# Patient Record
Sex: MALE | Race: OTHER | Employment: FULL TIME | URBAN - METROPOLITAN AREA
[De-identification: names, ages, dates, MRNs, and addresses within clinical notes are randomized per-mention and may not be internally consistent; named-entity substitution may affect disease eponyms.]

---

## 2017-03-20 ENCOUNTER — HOSPITAL ENCOUNTER (OUTPATIENT)
Dept: CT IMAGING | Age: 77
Discharge: HOME | End: 2017-03-20
Attending: FAMILY MEDICINE
Payer: MEDICARE

## 2017-03-20 DIAGNOSIS — N28.1 RENAL CYST: ICD-10-CM

## 2017-03-20 LAB — CREAT UR-MCNC: 1.4 MG/DL (ref 0.6–1.3)

## 2017-03-20 PROCEDURE — 74011636320 HC RX REV CODE- 636/320: Performed by: FAMILY MEDICINE

## 2017-03-20 PROCEDURE — 74178 CT ABD&PLV WO CNTR FLWD CNTR: CPT

## 2017-03-20 PROCEDURE — 82565 ASSAY OF CREATININE: CPT

## 2017-03-20 RX ADMIN — IOPAMIDOL 100 ML: 612 INJECTION, SOLUTION INTRAVENOUS at 13:00

## 2017-06-12 ENCOUNTER — HOSPITAL ENCOUNTER (OUTPATIENT)
Dept: GENERAL RADIOLOGY | Age: 77
Discharge: HOME | End: 2017-06-12
Payer: MEDICARE

## 2017-06-12 DIAGNOSIS — J44.9 COPD (CHRONIC OBSTRUCTIVE PULMONARY DISEASE) (HCC): ICD-10-CM

## 2017-06-12 PROCEDURE — 71020 XR CHEST PA LAT: CPT

## 2017-06-23 ENCOUNTER — HOSPITAL ENCOUNTER (OUTPATIENT)
Dept: LAB | Age: 77
Discharge: HOME | End: 2017-06-23
Payer: MEDICARE

## 2017-06-23 LAB
25(OH)D3 SERPL-MCNC: 26.7 NG/ML (ref 30–100)
ALBUMIN SERPL BCP-MCNC: 4 G/DL (ref 3.4–5)
ANION GAP BLD CALC-SCNC: 8 MMOL/L (ref 3–18)
BASOPHILS # BLD AUTO: 0 K/UL (ref 0–0.1)
BASOPHILS # BLD: 0 % (ref 0–2)
BUN SERPL-MCNC: 20 MG/DL (ref 7–18)
BUN/CREAT SERPL: 13 (ref 12–20)
CALCIUM SERPL-MCNC: 9 MG/DL (ref 8.5–10.1)
CALCIUM SERPL-MCNC: 9.5 MG/DL (ref 8.5–10.1)
CHLORIDE SERPL-SCNC: 108 MMOL/L (ref 100–108)
CO2 SERPL-SCNC: 27 MMOL/L (ref 21–32)
CREAT SERPL-MCNC: 1.52 MG/DL (ref 0.6–1.3)
CREAT UR-MCNC: 334 MG/DL (ref 30–125)
DIFFERENTIAL METHOD BLD: ABNORMAL
EOSINOPHIL # BLD: 0.3 K/UL (ref 0–0.4)
EOSINOPHIL NFR BLD: 6 % (ref 0–5)
ERYTHROCYTE [DISTWIDTH] IN BLOOD BY AUTOMATED COUNT: 13.4 % (ref 11.6–14.5)
GLUCOSE SERPL-MCNC: 106 MG/DL (ref 74–99)
HCT VFR BLD AUTO: 40.1 % (ref 36–48)
HGB BLD-MCNC: 14.3 G/DL (ref 13–16)
LYMPHOCYTES # BLD AUTO: 36 % (ref 21–52)
LYMPHOCYTES # BLD: 1.7 K/UL (ref 0.9–3.6)
MCH RBC QN AUTO: 28.4 PG (ref 24–34)
MCHC RBC AUTO-ENTMCNC: 35.7 G/DL (ref 31–37)
MCV RBC AUTO: 79.7 FL (ref 74–97)
MONOCYTES # BLD: 0.5 K/UL (ref 0.05–1.2)
MONOCYTES NFR BLD AUTO: 10 % (ref 3–10)
NEUTS SEG # BLD: 2.3 K/UL (ref 1.8–8)
NEUTS SEG NFR BLD AUTO: 48 % (ref 40–73)
PHOSPHATE SERPL-MCNC: 3.5 MG/DL (ref 2.5–4.9)
PLATELET # BLD AUTO: 279 K/UL (ref 135–420)
PMV BLD AUTO: 10.3 FL (ref 9.2–11.8)
POTASSIUM SERPL-SCNC: 3.7 MMOL/L (ref 3.5–5.5)
PROT UR-MCNC: 28 MG/DL
PROT/CREAT UR-RTO: 0.1
PTH-INTACT SERPL-MCNC: 44.6 PG/ML (ref 14–72)
RBC # BLD AUTO: 5.03 M/UL (ref 4.7–5.5)
SODIUM SERPL-SCNC: 143 MMOL/L (ref 136–145)
WBC # BLD AUTO: 4.8 K/UL (ref 4.6–13.2)

## 2017-06-23 PROCEDURE — 83970 ASSAY OF PARATHORMONE: CPT | Performed by: INTERNAL MEDICINE

## 2017-06-23 PROCEDURE — 80069 RENAL FUNCTION PANEL: CPT | Performed by: INTERNAL MEDICINE

## 2017-06-23 PROCEDURE — 84156 ASSAY OF PROTEIN URINE: CPT | Performed by: INTERNAL MEDICINE

## 2017-06-23 PROCEDURE — 82306 VITAMIN D 25 HYDROXY: CPT | Performed by: INTERNAL MEDICINE

## 2017-06-23 PROCEDURE — 85025 COMPLETE CBC W/AUTO DIFF WBC: CPT | Performed by: INTERNAL MEDICINE

## 2017-06-23 PROCEDURE — 36415 COLL VENOUS BLD VENIPUNCTURE: CPT | Performed by: INTERNAL MEDICINE

## 2017-07-26 ENCOUNTER — OFFICE VISIT (OUTPATIENT)
Dept: CARDIOLOGY CLINIC | Age: 77
End: 2017-07-26

## 2017-07-26 VITALS
DIASTOLIC BLOOD PRESSURE: 82 MMHG | HEART RATE: 52 BPM | BODY MASS INDEX: 26.2 KG/M2 | SYSTOLIC BLOOD PRESSURE: 133 MMHG | HEIGHT: 66 IN | WEIGHT: 163 LBS

## 2017-07-26 DIAGNOSIS — J44.9 CHRONIC OBSTRUCTIVE PULMONARY DISEASE, UNSPECIFIED COPD TYPE (HCC): ICD-10-CM

## 2017-07-26 DIAGNOSIS — C14.0 THROAT CANCER (HCC): ICD-10-CM

## 2017-07-26 DIAGNOSIS — Z01.810 PRE-OPERATIVE CARDIOVASCULAR EXAMINATION: Primary | ICD-10-CM

## 2017-07-26 DIAGNOSIS — I49.1 PAC (PREMATURE ATRIAL CONTRACTION): ICD-10-CM

## 2017-07-26 RX ORDER — ASPIRIN 81 MG/1
TABLET ORAL DAILY
COMMUNITY
Start: 2021-06-10 | End: 2021-06-10

## 2017-07-26 RX ORDER — AMLODIPINE BESYLATE 10 MG/1
TABLET ORAL DAILY
COMMUNITY
Start: 2021-06-10 | End: 2021-06-10

## 2017-07-26 RX ORDER — ATENOLOL 50 MG/1
50 TABLET ORAL DAILY
COMMUNITY

## 2017-07-26 RX ORDER — SIMVASTATIN 20 MG/1
20 TABLET, FILM COATED ORAL
COMMUNITY

## 2017-07-26 RX ORDER — PANTOPRAZOLE SODIUM 40 MG/1
40 GRANULE, DELAYED RELEASE ORAL DAILY
Status: ON HOLD | COMMUNITY
End: 2017-08-01 | Stop reason: CLARIF

## 2017-07-26 NOTE — PROGRESS NOTES
HISTORY OF PRESENT ILLNESS  Mila Clark is a 69 y.o. male. HPI Comments: Patient with throat cancer-scheduled for biopsy  Here for pre op clearance  Has h/o copd and pac on ekg  On follow up patient denies any chest pains,sob, palpitation or other significant symptoms. New Patient   The history is provided by the patient. Pertinent negatives include no chest pain, no abdominal pain, no headaches and no shortness of breath. Review of Systems   Constitutional: Negative for chills and fever. HENT: Negative for nosebleeds. Eyes: Negative for blurred vision and double vision. Respiratory: Negative for cough, hemoptysis, sputum production, shortness of breath and wheezing. Cardiovascular: Negative for chest pain, palpitations, orthopnea, claudication, leg swelling and PND. Gastrointestinal: Negative for abdominal pain, heartburn, nausea and vomiting. Musculoskeletal: Negative for myalgias. Skin: Negative for rash. Neurological: Negative for dizziness, weakness and headaches. Endo/Heme/Allergies: Does not bruise/bleed easily. Family History   Problem Relation Age of Onset    Heart Disease Brother      murmur       Past Medical History:   Diagnosis Date    Abdominal cramps 1/2013    Chronic obstructive pulmonary disease ([facility]     GERD (gastroesophageal reflux disease)     Laryngeal cancer (HCC)     as per MD Pre-op diagnosis       Past Surgical History:   Procedure Laterality Date    HX CHOLECYSTECTOMY      HX HEENT  2016       Social History   Substance Use Topics    Smoking status: Former Smoker     Packs/day: 0.50     Years: 60.00     Quit date: 7/26/2016    Smokeless tobacco: Never Used    Alcohol use Yes      Comment: occasional       Allergies   Allergen Reactions    Pcn [Penicillins] Rash     Other reaction(s): unknown       Prior to Admission medications    Medication Sig Start Date End Date Taking?  Authorizing Provider   simvastatin (ZOCOR) 20 mg tablet Take  by mouth nightly. Yes Historical Provider   amLODIPine (NORVASC) 10 mg tablet Take  by mouth daily. Yes Historical Provider   atenolol (TENORMIN) 50 mg tablet Take  by mouth daily. Yes Historical Provider   aspirin delayed-release 81 mg tablet Take  by mouth daily. Yes Historical Provider   Levocetirizine (XYZAL) 5 mg tablet Take 5 mg by mouth daily. Yes Historical Provider   pantoprazole (PROTONIX) 40 mg granules for oral suspension 40 mg daily. Historical Provider   gabapentin (NEURONTIN) 100 mg capsule Take  by mouth three (3) times daily. Historical Provider   meloxicam (MOBIC) 7.5 mg tablet Take  by mouth daily. Historical Provider   methocarbamol (ROBAXIN) 500 mg tablet Take  by mouth four (4) times daily. Historical Provider   ranitidine (ZANTAC) 150 mg tablet Take 150 mg by mouth two (2) times a day. Historical Provider         Visit Vitals    /82    Pulse (!) 52    Ht 5' 6\" (1.676 m)    Wt 73.9 kg (163 lb)    BMI 26.31 kg/m2       Physical Exam   Constitutional: He is oriented to person, place, and time. He appears well-developed and well-nourished. HENT:   Head: Normocephalic and atraumatic. Eyes: Conjunctivae are normal.   Neck: Neck supple. No JVD present. No tracheal deviation present. No thyromegaly present. Cardiovascular: Normal rate, regular rhythm and normal heart sounds. Exam reveals no gallop and no friction rub. No murmur heard. Pulmonary/Chest: Breath sounds normal. No respiratory distress. He has no wheezes. He has no rales. He exhibits no tenderness. Abdominal: Soft. There is no tenderness. Musculoskeletal: He exhibits no edema. Neurological: He is alert and oriented to person, place, and time. Skin: Skin is warm and dry. Psychiatric: He has a normal mood and affect. Mr. Arabella Campos has a reminder for a \"due or due soon\" health maintenance.  I have asked that he contact his primary care provider for follow-up on this health maintenance. I have personally reviewed patients ekg done at other facility. Sr,pac-7/2017    Assessment         ICD-10-CM ICD-9-CM    1. Pre-operative cardiovascular examination Z01.810 V72.81     stable cardiac status  ok to proceed -low risk       2. PAC (premature atrial contraction) I49.1 427.61     on ekg  asymptomatic   3. Throat cancer ([facility] C14.0 149.0     pre op for biopsy   4. Chronic obstructive pulmonary disease, unspecified COPD type ([facility] J44.9 496     on treatment stable       There are no discontinued medications. No orders of the defined types were placed in this encounter. Follow-up Disposition:  Return if symptoms worsen or fail to improve, for Cleared for planned surgery, low risk.

## 2017-07-26 NOTE — LETTER
2017 [address] [facility] [place] [address] Dear Gerald Longoria: 
 
Re: Rand Mariee : 1940 Mr. Devan Schmidt is cleared from a cardiac standpoint with mild risk for throat surgery scheduled on 17. If you have any questions or any further assistance is needed please contact our office. Sincerely, Orly Markham MD 
  
cc:  Basim Greer MD

## 2017-07-26 NOTE — LETTER
[facility] 1940 
 
2017 Dear Sybil Fish MD 
 
I had the pleasure of evaluating  Mr. Franco in office today. Below are the relevant portions of my assessment and plan of care. ICD-10-CM ICD-9-CM 1. Pre-operative cardiovascular examination Z01.810 V72.81   
 stable cardiac status 
ok to proceed -low risk 2. PAC (premature atrial contraction) I49.1 427.61   
 on ekg 
asymptomatic 3. Throat cancer ([facility] C14.0 149.0   
 pre op for biopsy 4. Chronic obstructive pulmonary disease, unspecified COPD type ([facility] J44.9 496   
 on treatment stable Current Outpatient Prescriptions Medication Sig Dispense Refill  simvastatin (ZOCOR) 20 mg tablet Take  by mouth nightly.  amLODIPine (NORVASC) 10 mg tablet Take  by mouth daily.  atenolol (TENORMIN) 50 mg tablet Take  by mouth daily.  aspirin delayed-release 81 mg tablet Take  by mouth daily.  Levocetirizine (XYZAL) 5 mg tablet Take 5 mg by mouth daily.  pantoprazole (PROTONIX) 40 mg granules for oral suspension 40 mg daily.  gabapentin (NEURONTIN) 100 mg capsule Take  by mouth three (3) times daily.  meloxicam (MOBIC) 7.5 mg tablet Take  by mouth daily.  methocarbamol (ROBAXIN) 500 mg tablet Take  by mouth four (4) times daily.  ranitidine (ZANTAC) 150 mg tablet Take 150 mg by mouth two (2) times a day. Orders Placed This Encounter  simvastatin (ZOCOR) 20 mg tablet Sig: Take  by mouth nightly.  pantoprazole (PROTONIX) 40 mg granules for oral suspension Si mg daily.  amLODIPine (NORVASC) 10 mg tablet Sig: Take  by mouth daily.  atenolol (TENORMIN) 50 mg tablet Sig: Take  by mouth daily.  aspirin delayed-release 81 mg tablet Sig: Take  by mouth daily. If you have questions, please do not hesitate to call me. I look forward to following Mr. Franco along with you. Sincerely, Drew Park MD

## 2017-08-01 PROBLEM — J38.3 LESION OF VOCAL CORD: Status: ACTIVE | Noted: 2017-08-01

## 2017-10-25 ENCOUNTER — HOSPITAL ENCOUNTER (OUTPATIENT)
Dept: CT IMAGING | Age: 77
Discharge: HOME | End: 2017-10-25
Attending: FAMILY MEDICINE
Payer: MEDICARE

## 2017-10-25 DIAGNOSIS — Q61.02 MULTIPLE RENAL CYSTS: ICD-10-CM

## 2017-10-25 LAB — CREAT UR-MCNC: 1.3 MG/DL (ref 0.6–1.3)

## 2017-10-25 PROCEDURE — 74011636320 HC RX REV CODE- 636/320

## 2017-10-25 PROCEDURE — 74178 CT ABD&PLV WO CNTR FLWD CNTR: CPT

## 2017-10-25 PROCEDURE — 82565 ASSAY OF CREATININE: CPT

## 2017-10-25 RX ADMIN — IOPAMIDOL 100 ML: 612 INJECTION, SOLUTION INTRAVENOUS at 07:34

## 2018-07-17 ENCOUNTER — OFFICE VISIT (OUTPATIENT)
Dept: UROLOGY | Age: 78
End: 2018-07-17

## 2018-07-17 VITALS
HEIGHT: 66 IN | OXYGEN SATURATION: 99 % | WEIGHT: 167 LBS | DIASTOLIC BLOOD PRESSURE: 72 MMHG | HEART RATE: 54 BPM | BODY MASS INDEX: 26.84 KG/M2 | SYSTOLIC BLOOD PRESSURE: 142 MMHG

## 2018-07-17 DIAGNOSIS — R97.20 RISING PSA LEVEL: Primary | ICD-10-CM

## 2018-07-17 LAB
BILIRUB UR QL STRIP: NEGATIVE
GLUCOSE UR-MCNC: NEGATIVE MG/DL
KETONES P FAST UR STRIP-MCNC: NEGATIVE MG/DL
PH UR STRIP: 5.5 [PH] (ref 4.6–8)
PROT UR QL STRIP: NEGATIVE
SP GR UR STRIP: 1.02 (ref 1–1.03)
UA UROBILINOGEN AMB POC: NORMAL (ref 0.2–1)
URINALYSIS CLARITY POC: CLEAR
URINALYSIS COLOR POC: YELLOW
URINE BLOOD POC: NEGATIVE
URINE LEUKOCYTES POC: NEGATIVE
URINE NITRITES POC: NEGATIVE

## 2018-07-17 NOTE — PROGRESS NOTES
MrAnali Denise Haque has a reminder for a \"due or due soon\" health maintenance. I have asked that he contact his primary care provider for follow-up on this health maintenance.

## 2018-07-17 NOTE — MR AVS SNAPSHOT
[address] [facility] [address] [address] 
[phone] Patient: Oj Armijo MRN: [id number]:[id number] Visit Information Date & Time Provider Department Dept. Phone Encounter #  
 7/17/2018  1:45 PM Waqar Hodges [place] [facility] [phone] [id number] Follow-up Instructions Return in about 1 year (around 7/17/2019) for PSA MIGDALIA PVR. Follow-up and Disposition History Your Appointments 7/17/2019  9:00 AM  
Office Visit with Hemant Hall MD  
[facility] [address] [facility]) Appt Note: PSA  
 [address] [facility] [address] [address]  
[phone] [address] [facility] [address] Upcoming Health Maintenance Date Due DTaP/Tdap/Td series (1 - Tdap) 3/31/1962 ZOSTER VACCINE AGE 60> 1/31/2001 GLAUCOMA SCREENING Q2Y 3/31/2006 Pneumococcal 65+ High/Highest Risk (1 of 2 - PCV13) 3/31/2006 MEDICARE YEARLY EXAM 3/14/2018 Influenza Age 5 to Adult 8/1/2018 Allergies as of 7/17/2018  Review Complete On: 7/17/2018 By: Hemant Hall MD  
  
 Severity Noted Reaction Type Reactions Pcn [Penicillins]  08/15/2012    Rash Other reaction(s): unknown Current Immunizations  Never Reviewed No immunizations on file. Not reviewed this visit You Were Diagnosed With   
  
 Codes Comments Rising PSA level    -  Primary ICD-10-CM: R97.20 ICD-9-CM: 790.93 Vitals BP Pulse Height(growth percentile) Weight(growth percentile) SpO2 BMI  
 142/72 (BP 1 Location: Left arm, BP Patient Position: Sitting) (!) 54 5' 6\" (1.676 m) 167 lb (75.8 kg) 99% 26.95 kg/m2 Smoking Status Former Smoker Vitals History BMI and BSA Data Body Mass Index Body Surface Area  
 26.95 kg/m 2 1.88 m 2 Preferred Pharmacy Pharmacy Name Phone [id number]- Collin Bowden, [address] [place] [address] Your Updated Medication List  
  
   
This list is accurate as of 7/17/18  4:35 PM.  Always use your most recent med list. amLODIPine 10 mg tablet Commonly known as:  Jailene Johansen Take  by mouth daily. aspirin delayed-release 81 mg tablet Take  by mouth daily. atenolol 50 mg tablet Commonly known as:  TENORMIN Take  by mouth daily. levocetirizine 5 mg tablet Commonly known as:  Oletha Certain Take 5 mg by mouth daily. simvastatin 20 mg tablet Commonly known as:  ZOCOR Take  by mouth nightly. We Performed the Following AMB POC URINALYSIS DIP STICK AUTO W/O MICRO [72232 CPT(R)] Follow-up Instructions Return in about 1 year (around 7/17/2019) for PSA MIGDALIA PVR. Patient Instructions Prostate Cancer Screening: Care Instructions Your Care Instructions The prostate gland is an organ found just below a man's bladder. It is the size and shape of a walnut. It surrounds the tube that carries urine from the bladder out of the body through the penis. This tube is called the urethra. Prostate cancer is the abnormal growth of cells in the prostate. It is the second most common type of cancer in men. (Skin cancer is the most common.) Most cases of prostate cancer occur in men older than 72. The disease runs in families. And it's more common in -American men. When it's found and treated early, prostate cancer may be cured. But it is not always treated. This is because prostate cancer may not shorten your life, especially if you are older and the cancer is growing slowly. Follow-up care is a key part of your treatment and safety. Be sure to make and go to all appointments, and call your doctor if you are having problems. It's also a good idea to know your test results and keep a list of the medicines you take. What are the screening tests for prostate cancer? The main screening test for prostate cancer is the prostate-specific antigen (PSA) test. This is a blood test that measures how much PSA is in your blood. A high level may mean that you have an enlargement, an infection, or cancer. Along with the PSA test, you may have a digital rectal exam. The digital (finger) rectal exam checks for anything abnormal in your prostate. To do the exam, the doctor puts a lubricated, gloved finger into your rectum. If these tests suggest cancer, you may need a prostate biopsy. How is prostate cancer diagnosed? In a biopsy, the doctor takes small tissue samples from your prostate gland. Another doctor then looks at the tissue under a microscope to see if there are cancer cells, signs of infection, or other problems. The results help diagnose prostate cancer. What are the pros and cons of screening? Neither a PSA test nor a digital rectal exam can tell you for sure that you do or do not have cancer. But they can help you decide if you need more tests, such as a prostate biopsy. Screening tests may be useful because most men with prostate cancer don't have symptoms. It can be hard to know if you have cancer until it is more advanced. And then it's harder to treat. But having a PSA test can also cause harm. The test may show high levels of PSA that aren't caused by cancer. So you could have a prostate biopsy you didn't need. Or the PSA test might be normal when there is cancer, so a cancer might not be found early. The test can also find cancers that would never have caused a problem during your lifetime. So you might have treatment that was not needed. Prostate cancer usually develops late in life and grows slowly. For many men, it does not shorten their lives. Some experts advise screening only for men who are at high risk. Talk with your doctor to see if screening is right for you. Where can you learn more? Go to http://errol-tigre.info/. Enter R550 in the search box to learn more about \"Prostate Cancer Screening: Care Instructions. \" Current as of: May 12, 2017 Content Version: 11.7 © 6583-9692 Presidio. Care instructions adapted under license by Ease My Sell (which disclaims liability or warranty for this information). If you have questions about a medical condition or this instruction, always ask your healthcare professional. [address] any warranty or liability for your use of this information. Prostate-Specific Antigen (PSA) Test: About This Test 
What is it? A prostate-specific antigen (PSA) test measures the amount of PSA in your blood. PSA is released by a man's prostate gland into his blood. A high PSA level may mean that you have an enlargement, infection, or cancer of the prostate. Why is this test done? You may have this test to: · Check for prostate cancer. · Watch prostate cancer and see if treatment is working. How can you prepare for the test? 
Do not ejaculate during the 2 days before your PSA blood test, either during sex or masturbation. What happens before the test? 
Tell your doctor if you have had a: 
· Test to look at your bladder (cystoscopy) in the past several weeks. · Prostate biopsy in the past several weeks. · Prostate infection or urinary tract infection that has not gone away. · Tube (catheter) inserted into your bladder to drain urine recently. What happens during the test? 
A health professional takes a sample of your blood. What happens after the test? 
You can go back to your usual activities right away. When should you call for help? Watch closely for changes in your health, and be sure to contact your doctor if you have any questions about this test. 
Follow-up care is a key part of your treatment and safety.  Be sure to make and go to all appointments, and call your doctor if you are having problems. It's also a good idea to keep a list of the medicines you take. Ask your doctor when you can expect to have your test results. Where can you learn more? Go to http://errol-tigre.info/. Enter R591 in the search box to learn more about \"Prostate-Specific Antigen (PSA) Test: About This Test.\" Current as of: May 12, 2017 Content Version: 11.7 © 9676-0412 Worklight. Care instructions adapted under license by SurDoc (which disclaims liability or warranty for this information). If you have questions about a medical condition or this instruction, always ask your healthcare professional. [address] any warranty or liability for your use of this information. Patient Instructions History Introducing [facility]! Renuka Puentes introduces Stemnion patient portal. Now you can access parts of your medical record, email your doctor's office, and request medication refills online. 1. In your internet browser, go to [email or web address] 2. Click on the First Time User? Click Here link in the Sign In box. You will see the New Member Sign Up page. 3. Enter your Stemnion Access Code exactly as it appears below. You will not need to use this code after youve completed the sign-up process. If you do not sign up before the expiration date, you must request a new code. · Stemnion Access Code: [email or web address] Expires: 10/15/2018  4:35 PM 
 
4. Enter the last four digits of your Social Security Number (xxxx) and Date of Birth (mm/dd/yyyy) as indicated and click Submit. You will be taken to the next sign-up page. 5. Create a Stemnion ID. This will be your Stemnion login ID and cannot be changed, so think of one that is secure and easy to remember. 6. Create a ComVibet password. You can change your password at any time. 7. Enter your Password Reset Question and Answer. This can be used at a later time if you forget your password. 8. Enter your e-mail address. You will receive e-mail notification when new information is available in [address] 9. Click Sign Up. You can now view and download portions of your medical record. 10. Click the Download Summary menu link to download a portable copy of your medical information. If you have questions, please visit the Frequently Asked Questions section of the Eayun website. Remember, Eayun is NOT to be used for urgent needs. For medical emergencies, dial 911. Now available from your iPhone and Android! Please provide this summary of care documentation to your next provider. Your primary care clinician is listed as Chris Cortes. If you have any questions after today's visit, please call [phone].

## 2018-07-17 NOTE — PATIENT INSTRUCTIONS
Prostate Cancer Screening: Care Instructions Your Care Instructions The prostate gland is an organ found just below a man's bladder. It is the size and shape of a walnut. It surrounds the tube that carries urine from the bladder out of the body through the penis. This tube is called the urethra. Prostate cancer is the abnormal growth of cells in the prostate. It is the second most common type of cancer in men. (Skin cancer is the most common.) Most cases of prostate cancer occur in men older than 72. The disease runs in families. And it's more common in -American men. When it's found and treated early, prostate cancer may be cured. But it is not always treated. This is because prostate cancer may not shorten your life, especially if you are older and the cancer is growing slowly. Follow-up care is a key part of your treatment and safety. Be sure to make and go to all appointments, and call your doctor if you are having problems. It's also a good idea to know your test results and keep a list of the medicines you take. What are the screening tests for prostate cancer? The main screening test for prostate cancer is the prostate-specific antigen (PSA) test. This is a blood test that measures how much PSA is in your blood. A high level may mean that you have an enlargement, an infection, or cancer. Along with the PSA test, you may have a digital rectal exam. The digital (finger) rectal exam checks for anything abnormal in your prostate. To do the exam, the doctor puts a lubricated, gloved finger into your rectum. If these tests suggest cancer, you may need a prostate biopsy. How is prostate cancer diagnosed? In a biopsy, the doctor takes small tissue samples from your prostate gland. Another doctor then looks at the tissue under a microscope to see if there are cancer cells, signs of infection, or other problems. The results help diagnose prostate cancer.  
What are the pros and cons of screening? Neither a PSA test nor a digital rectal exam can tell you for sure that you do or do not have cancer. But they can help you decide if you need more tests, such as a prostate biopsy. Screening tests may be useful because most men with prostate cancer don't have symptoms. It can be hard to know if you have cancer until it is more advanced. And then it's harder to treat. But having a PSA test can also cause harm. The test may show high levels of PSA that aren't caused by cancer. So you could have a prostate biopsy you didn't need. Or the PSA test might be normal when there is cancer, so a cancer might not be found early. The test can also find cancers that would never have caused a problem during your lifetime. So you might have treatment that was not needed. Prostate cancer usually develops late in life and grows slowly. For many men, it does not shorten their lives. Some experts advise screening only for men who are at high risk. Talk with your doctor to see if screening is right for you. Where can you learn more? Go to http://errol-tigre.info/. Enter R550 in the search box to learn more about \"Prostate Cancer Screening: Care Instructions. \" Current as of: May 12, 2017 Content Version: 11.7 © 1486-9452 Nortis. Care instructions adapted under license by Hubs1 (which disclaims liability or warranty for this information). If you have questions about a medical condition or this instruction, always ask your healthcare professional. [place] [address] any warranty or liability for your use of this information. Prostate-Specific Antigen (PSA) Test: About This Test 
What is it? A prostate-specific antigen (PSA) test measures the amount of PSA in your blood. PSA is released by a man's prostate gland into his blood. A high PSA level may mean that you have an enlargement, infection, or cancer of the prostate.  
Why is this test done? 
You may have this test to: · Check for prostate cancer. · Watch prostate cancer and see if treatment is working. How can you prepare for the test? 
Do not ejaculate during the 2 days before your PSA blood test, either during sex or masturbation. What happens before the test? 
Tell your doctor if you have had a: 
· Test to look at your bladder (cystoscopy) in the past several weeks. · Prostate biopsy in the past several weeks. · Prostate infection or urinary tract infection that has not gone away. · Tube (catheter) inserted into your bladder to drain urine recently. What happens during the test? 
A health professional takes a sample of your blood. What happens after the test? 
You can go back to your usual activities right away. When should you call for help? Watch closely for changes in your health, and be sure to contact your doctor if you have any questions about this test. 
Follow-up care is a key part of your treatment and safety. Be sure to make and go to all appointments, and call your doctor if you are having problems. It's also a good idea to keep a list of the medicines you take. Ask your doctor when you can expect to have your test results. Where can you learn more? Go to http://errol-tigre.info/. Enter F991 in the search box to learn more about \"Prostate-Specific Antigen (PSA) Test: About This Test.\" Current as of: May 12, 2017 Content Version: 11.7 © 2032-7347 Pulmatrix, Incorporated. Care instructions adapted under license by Pointworthy (which disclaims liability or warranty for this information). If you have questions about a medical condition or this instruction, always ask your healthcare professional. [address] any warranty or liability for your use of this information.

## 2018-07-17 NOTE — PROGRESS NOTES
Saleem Jovanna 69 y.o. male     Mr. Reno Mortimer seen today for yearly prostate evaluation  pt is voiding well with a solid stream good control nocturia once per night-no complaints regarding urination at this time    No family history of prostate malignancy     History significant for kidney stone disease-kidney stone passed spontaneously 1990-in [place], [place]    PSA 2.9 in April 2018 by PCP           Review of Systems:   CNS: No seizures syncope visual changes or neurologic symptoms/+  Respiratory: No wheezing   Cardiovascular: No chest pain or palpitations  Intestinal: No diarrhea constipation or dyspepsia  Urinary: No urgency frequency dysuria or hematuria  Skeletal: No bone or joint pain no muscle aches or pains  Endocrine: No diabetes or thyroid disease  Other:  Allergies: Allergies   Allergen Reactions    Pcn [Penicillins] Rash     Other reaction(s): unknown      Medications:    Current Outpatient Prescriptions   Medication Sig Dispense Refill    simvastatin (ZOCOR) 20 mg tablet Take  by mouth nightly.  amLODIPine (NORVASC) 10 mg tablet Take  by mouth daily.  atenolol (TENORMIN) 50 mg tablet Take  by mouth daily.  aspirin delayed-release 81 mg tablet Take  by mouth daily.  Levocetirizine (XYZAL) 5 mg tablet Take 5 mg by mouth daily.            Past Medical History:   Diagnosis Date    Abdominal cramps 1/2013    Chronic obstructive pulmonary disease (HCC)     GERD (gastroesophageal reflux disease)     Laryngeal cancer (HCC)     as per MD Pre-op diagnosis      Past Surgical History:   Procedure Laterality Date    COLONOSCOPY N/A 2/20/2018    COLONOSCOPY, SCREENING /c Hot Snared Polypectomy performed by Sylvia Pantoja MD at [address] [place] [address] HX CHOLECYSTECTOMY      HX HEENT  2016     Family History   Problem Relation Age of Onset    Heart Disease Brother      murmur        Physical Examination: Well-nourished mature trim and fit appearing adult male in no apparent distress     Prostate by MIGDALIA is markedly enlarged, smooth, rounded, benign consistency nontender-no induration no nodularity  No rectal masses induration or tenderness    Urinalysis: Negative dipstick/nitrite negative/heme-negative    Impression: Asymptomatic BPH    Plan: RTC 1 year MIGDALIA PVR PSA      More than 1/2 of this 15 minute visit was spent in counselling and coordination of care, as described above. Hyacinth Sanchez MD  -electronically signed-    PLEASE NOTE:  This document has been produced using voice recognition software. Unrecognized errors in transcription may be present.

## 2019-01-07 ENCOUNTER — HOSPITAL ENCOUNTER (OUTPATIENT)
Dept: LAB | Age: 79
Discharge: HOME | End: 2019-01-07
Payer: MEDICARE

## 2019-01-07 LAB
25(OH)D3 SERPL-MCNC: 17.2 NG/ML (ref 30–100)
ALBUMIN SERPL-MCNC: 3.8 G/DL (ref 3.4–5)
ANION GAP SERPL CALC-SCNC: 7 MMOL/L (ref 3–18)
BUN SERPL-MCNC: 23 MG/DL (ref 7–18)
BUN/CREAT SERPL: 16 (ref 12–20)
CALCIUM SERPL-MCNC: 8.7 MG/DL (ref 8.5–10.1)
CALCIUM SERPL-MCNC: 8.8 MG/DL (ref 8.5–10.1)
CHLORIDE SERPL-SCNC: 108 MMOL/L (ref 100–108)
CO2 SERPL-SCNC: 27 MMOL/L (ref 21–32)
CREAT SERPL-MCNC: 1.46 MG/DL (ref 0.6–1.3)
CREAT UR-MCNC: 235 MG/DL (ref 30–125)
ERYTHROCYTE [DISTWIDTH] IN BLOOD BY AUTOMATED COUNT: 13.6 % (ref 11.6–14.5)
GLUCOSE SERPL-MCNC: 89 MG/DL (ref 74–99)
HCT VFR BLD AUTO: 41.1 % (ref 36–48)
HGB BLD-MCNC: 14.3 G/DL (ref 13–16)
MCH RBC QN AUTO: 27.3 PG (ref 24–34)
MCHC RBC AUTO-ENTMCNC: 34.8 G/DL (ref 31–37)
MCV RBC AUTO: 78.6 FL (ref 74–97)
PHOSPHATE SERPL-MCNC: 3.8 MG/DL (ref 2.5–4.9)
PLATELET # BLD AUTO: 288 K/UL (ref 135–420)
PMV BLD AUTO: 10.2 FL (ref 9.2–11.8)
POTASSIUM SERPL-SCNC: 3.9 MMOL/L (ref 3.5–5.5)
PROT UR-MCNC: 48 MG/DL
PROT/CREAT UR-RTO: 0.2
PTH-INTACT SERPL-MCNC: 60.2 PG/ML (ref 18.4–88)
RBC # BLD AUTO: 5.23 M/UL (ref 4.7–5.5)
SODIUM SERPL-SCNC: 142 MMOL/L (ref 136–145)
WBC # BLD AUTO: 4.6 K/UL (ref 4.6–13.2)

## 2019-01-07 PROCEDURE — 83970 ASSAY OF PARATHORMONE: CPT

## 2019-01-07 PROCEDURE — 82306 VITAMIN D 25 HYDROXY: CPT

## 2019-01-07 PROCEDURE — 36415 COLL VENOUS BLD VENIPUNCTURE: CPT

## 2019-01-07 PROCEDURE — 80069 RENAL FUNCTION PANEL: CPT

## 2019-01-07 PROCEDURE — 84156 ASSAY OF PROTEIN URINE: CPT

## 2019-01-07 PROCEDURE — 85027 COMPLETE CBC AUTOMATED: CPT

## 2019-04-30 ENCOUNTER — HOSPITAL ENCOUNTER (OUTPATIENT)
Dept: LAB | Age: 79
Discharge: HOME | End: 2019-04-30
Payer: MEDICARE

## 2019-04-30 LAB
BUN SERPL-MCNC: 19 MG/DL (ref 7–18)
CREAT SERPL-MCNC: 1.57 MG/DL (ref 0.6–1.3)

## 2019-04-30 PROCEDURE — 36415 COLL VENOUS BLD VENIPUNCTURE: CPT

## 2019-04-30 PROCEDURE — 84520 ASSAY OF UREA NITROGEN: CPT

## 2019-05-08 ENCOUNTER — HOSPITAL ENCOUNTER (OUTPATIENT)
Dept: CT IMAGING | Age: 79
Discharge: HOME | End: 2019-05-08
Attending: OTOLARYNGOLOGY
Payer: MEDICARE

## 2019-05-08 DIAGNOSIS — C32.9 CARCINOMA LARYNX (HCC): ICD-10-CM

## 2019-05-08 PROCEDURE — 70491 CT SOFT TISSUE NECK W/DYE: CPT

## 2019-05-08 PROCEDURE — 74011636320 HC RX REV CODE- 636/320: Performed by: OTOLARYNGOLOGY

## 2019-05-08 RX ADMIN — IOPAMIDOL 80 ML: 612 INJECTION, SOLUTION INTRAVENOUS at 17:16

## 2019-06-10 ENCOUNTER — HOSPITAL ENCOUNTER (OUTPATIENT)
Dept: GENERAL RADIOLOGY | Age: 79
Discharge: HOME | End: 2019-06-10
Payer: MEDICARE

## 2019-06-10 DIAGNOSIS — Z01.818 PREOP EXAMINATION: ICD-10-CM

## 2019-06-10 PROCEDURE — 71046 X-RAY EXAM CHEST 2 VIEWS: CPT

## 2019-07-17 ENCOUNTER — HOSPITAL ENCOUNTER (OUTPATIENT)
Dept: LAB | Age: 79
Discharge: HOME | End: 2019-07-17
Payer: MEDICARE

## 2019-07-17 ENCOUNTER — OFFICE VISIT (OUTPATIENT)
Dept: UROLOGY | Age: 79
End: 2019-07-17

## 2019-07-17 VITALS
HEIGHT: 66 IN | WEIGHT: 167 LBS | BODY MASS INDEX: 26.84 KG/M2 | OXYGEN SATURATION: 95 % | SYSTOLIC BLOOD PRESSURE: 147 MMHG | DIASTOLIC BLOOD PRESSURE: 91 MMHG | HEART RATE: 82 BPM

## 2019-07-17 DIAGNOSIS — N40.1 BENIGN PROSTATIC HYPERPLASIA WITH LOWER URINARY TRACT SYMPTOMS, SYMPTOM DETAILS UNSPECIFIED: ICD-10-CM

## 2019-07-17 DIAGNOSIS — N40.1 BENIGN PROSTATIC HYPERPLASIA WITH LOWER URINARY TRACT SYMPTOMS, SYMPTOM DETAILS UNSPECIFIED: Primary | ICD-10-CM

## 2019-07-17 LAB
BILIRUB UR QL STRIP: NEGATIVE
GLUCOSE UR-MCNC: NEGATIVE MG/DL
KETONES P FAST UR STRIP-MCNC: NEGATIVE MG/DL
PH UR STRIP: 6 [PH] (ref 4.6–8)
PROT UR QL STRIP: NEGATIVE
PSA SERPL-MCNC: 3.1 NG/ML (ref 0–4)
SP GR UR STRIP: 1.02 (ref 1–1.03)
UA UROBILINOGEN AMB POC: NORMAL (ref 0.2–1)
URINALYSIS CLARITY POC: CLEAR
URINALYSIS COLOR POC: YELLOW
URINE BLOOD POC: NORMAL
URINE LEUKOCYTES POC: NEGATIVE
URINE NITRITES POC: NEGATIVE

## 2019-07-17 PROCEDURE — 36415 COLL VENOUS BLD VENIPUNCTURE: CPT

## 2019-07-17 PROCEDURE — 84153 ASSAY OF PSA TOTAL: CPT

## 2019-07-17 RX ORDER — BUDESONIDE AND FORMOTEROL FUMARATE DIHYDRATE 80; 4.5 UG/1; UG/1
2 AEROSOL RESPIRATORY (INHALATION) 2 TIMES DAILY
COMMUNITY
End: 2021-06-10

## 2019-07-17 RX ORDER — SILDENAFIL 100 MG/1
TABLET, FILM COATED ORAL
Refills: 2 | COMMUNITY
Start: 2019-06-09 | End: 2021-06-10

## 2019-07-17 NOTE — PROGRESS NOTES
Anabel  67 y.o. male     Mr. Víctor Hussein seen today for annual prostate evaluation  Well with solid stream good control nocturia twice per night-no complaints  No family history of prostate malignancy     History significant for kidney stone disease-kidney stone passed spontaneously 1990-in [place], [place]     PSA 2.9 in April 2018 by PCP    PVR 49 cc in July 2019           Review of Systems:   CNS: No seizures syncope visual changes or neurologic symptoms/+  Respiratory: No wheezing   Cardiovascular: No chest pain or palpitations  Intestinal: No diarrhea constipation or dyspepsia  Urinary: No urgency frequency dysuria or hematuria  Skeletal: No bone or joint pain no muscle aches or pains  Endocrine: No diabetes or thyroid disease  Other:  Allergies: Allergies   Allergen Reactions    Pcn [Penicillins] Rash      Medications:    Current Outpatient Medications   Medication Sig Dispense Refill    budesonide-formoterol (SYMBICORT) 80-4.5 mcg/actuation HFAA Symbicort 80 mcg-4.5 mcg/actuation HFA aerosol inhaler      sildenafil citrate (VIAGRA) 100 mg tablet TAKE 1 (ONE) TABLET DAILY, AS NEEDED TAKE 30-60 MINUTES PRIOR TO SEXUAL ENCOUNTER  2    cholecalciferol (VITAMIN D3) 1,000 unit cap Take  by mouth daily.  simvastatin (ZOCOR) 20 mg tablet Take  by mouth nightly.  amLODIPine (NORVASC) 10 mg tablet Take  by mouth daily.  atenolol (TENORMIN) 50 mg tablet Take  by mouth daily.  aspirin delayed-release 81 mg tablet Take  by mouth daily.  Levocetirizine (XYZAL) 5 mg tablet Take 5 mg by mouth daily.            Past Medical History:   Diagnosis Date    Abdominal cramps 1/2013    Chronic obstructive pulmonary disease (HCC)     GERD (gastroesophageal reflux disease)     Laryngeal cancer (HCC)     as per MD Pre-op diagnosis      Past Surgical History:   Procedure Laterality Date    COLONOSCOPY N/A 2/20/2018    COLONOSCOPY, SCREENING /c Hot Snared Polypectomy performed by Corey Brush MD at [facility] ENDOSCOPY    HX CHOLECYSTECTOMY      HX HEENT  2016    throat ca     Social History     Socioeconomic History    Marital status:      Spouse name: Not on file    Number of children: Not on file    Years of education: Not on file    Highest education level: Not on file   Occupational History    Not on file   Social Needs    Financial resource strain: Not on file    Food insecurity:     Worry: Not on file     Inability: Not on file    Transportation needs:     Medical: Not on file     Non-medical: Not on file   Tobacco Use    Smoking status: Former Smoker     Packs/day: 0.50     Years: 60.00     Pack years: 30.00     Last attempt to quit: 2016     Years since quittin.9    Smokeless tobacco: Never Used   Substance and Sexual Activity    Alcohol use: Yes     Comment: occasional    Drug use: No    Sexual activity: Not on file   Lifestyle    Physical activity:     Days per week: Not on file     Minutes per session: Not on file    Stress: Not on file   Relationships    Social connections:     Talks on phone: Not on file     Gets together: Not on file     Attends Mu-ism service: Not on file     Active member of club or organization: Not on file     Attends meetings of clubs or organizations: Not on file     Relationship status: Not on file    Intimate partner violence:     Fear of current or ex partner: Not on file     Emotionally abused: Not on file     Physically abused: Not on file     Forced sexual activity: Not on file   Other Topics Concern    Not on file   Social History Narrative    Not on file      Family History   Problem Relation Age of Onset    Heart Disease Brother         murmur        Physical Examination: Well-nourished mature male in no apparent distress    Prostate by MIGDALIA is large rounded smooth benign consistency nontender-no induration no nodularity   No rectal masses induration or tenderness     Negative dipstick/nitrite negative/heme-negative urinalysis: PVR today 49 cc    Impression: Asymptomatic BPH    Plan: PSA today    RTC 1 year PSA MIGDALIA PVR      More than 1/2 of this 15 minute visit was spent in counselling and coordination of care, as described above. Johan Lennon MD  -electronically signed-    PLEASE NOTE:  This document has been produced using voice recognition software. Unrecognized errors in transcription may be present.

## 2019-07-17 NOTE — PATIENT INSTRUCTIONS
Benign Prostatic Hyperplasia: Care Instructions  Your Care Instructions    Benign prostatic hyperplasia, or BPH, is an enlarged prostate gland. The prostate is a small gland that makes some of the fluid in semen. Prostate enlargement happens to almost all men as they age. It is usually not serious. BPH does not cause prostate cancer. As the prostate gets bigger, it may partly block the flow of urine. You may have a hard time getting a urine stream started or completely stopped. BPH can cause dribbling. You may have a weak urine stream, or you may have to urinate more often than you used to, especially at night. Most men find these problems easy to manage. You do not need treatment unless your symptoms bother you a lot or you have other problems, such as bladder infections or stones. In these cases, medicines may help. Surgery is not needed unless the urine flow is blocked or the symptoms do not get better with medicine. Follow-up care is a key part of your treatment and safety. Be sure to make and go to all appointments, and call your doctor if you are having problems. It's also a good idea to know your test results and keep a list of the medicines you take. How can you care for yourself at home? · Take plenty of time to urinate. Try to relax. · Try \"double voiding. \" Urinate as much you can, relax for a few moments, and then try to urinate again. · Sit on the toilet to urinate. · Read or think of other things while you are waiting. · Turn on a faucet, or try to picture running water. Some men find that this helps get their urine flowing. · If dribbling is a problem, wash your penis daily to avoid skin irritation and infection. · Avoid caffeine and alcohol. These drinks will increase how often you need to urinate. Spread your fluid intake throughout the day. If the urge to urinate often wakes you at night, limit your fluid intake in the evening. Urinate right before you go to bed.   · Many over-the-counter cold and allergy medicines can make the symptoms of BPH worse. Avoid antihistamines, decongestants, and allergy pills, if you can. Read the warnings on the package. · If you take any prescription medicines, especially tranquilizers or antidepressants, ask your doctor or pharmacist whether they can cause urination problems. There may be other medicines you can use that do not cause urinary problems. · Be safe with medicines. Take your medicines exactly as prescribed. Call your doctor if you think you are having a problem with your medicine. When should you call for help? Call your doctor now or seek immediate medical care if:    · You cannot urinate at all.     · You have symptoms of a urinary infection. For example:  ? You have blood or pus in your urine. ? You have pain in your back just below your rib cage. This is called flank pain. ? You have a fever, chills, or body aches. ? It hurts to urinate. ? You have groin or belly pain.    Watch closely for changes in your health, and be sure to contact your doctor if:    · It hurts when you ejaculate.     · Your urinary problems get a lot worse or bother you a lot. Where can you learn more? Go to http://errol-tigre.info/. Enter V409 in the search box to learn more about \"Benign Prostatic Hyperplasia: Care Instructions. \"  Current as of: September 26, 2018  Content Version: 11.9  © 7779-5789 [employer]. Care instructions adapted under license by [employer] (which disclaims liability or warranty for this information). If you have questions about a medical condition or this instruction, always ask your healthcare professional. [address] any warranty or liability for your use of this information.

## 2019-07-17 NOTE — PROGRESS NOTES
Mr. Eric Bonilla has a reminder for a \"due or due soon\" health maintenance. I have asked that he contact his primary care provider for follow-up on this health maintenance.

## 2019-09-27 ENCOUNTER — HOSPITAL ENCOUNTER (OUTPATIENT)
Dept: LAB | Age: 79
Discharge: HOME | End: 2019-09-27
Payer: MEDICARE

## 2019-09-27 LAB
25(OH)D3 SERPL-MCNC: 24.9 NG/ML (ref 30–100)
ALBUMIN SERPL-MCNC: 3.4 G/DL (ref 3.4–5)
ANION GAP SERPL CALC-SCNC: 4 MMOL/L (ref 3–18)
BUN SERPL-MCNC: 22 MG/DL (ref 7–18)
BUN/CREAT SERPL: 17 (ref 12–20)
CALCIUM SERPL-MCNC: 8.6 MG/DL (ref 8.5–10.1)
CALCIUM SERPL-MCNC: 9.1 MG/DL (ref 8.5–10.1)
CHLORIDE SERPL-SCNC: 110 MMOL/L (ref 100–111)
CO2 SERPL-SCNC: 29 MMOL/L (ref 21–32)
CREAT SERPL-MCNC: 1.3 MG/DL (ref 0.6–1.3)
CREAT UR-MCNC: 159 MG/DL (ref 30–125)
ERYTHROCYTE [DISTWIDTH] IN BLOOD BY AUTOMATED COUNT: 14.3 % (ref 11.6–14.5)
GLUCOSE SERPL-MCNC: 83 MG/DL (ref 74–99)
HCT VFR BLD AUTO: 35.2 % (ref 36–48)
HGB BLD-MCNC: 11.9 G/DL (ref 13–16)
MCH RBC QN AUTO: 26.9 PG (ref 24–34)
MCHC RBC AUTO-ENTMCNC: 33.8 G/DL (ref 31–37)
MCV RBC AUTO: 79.6 FL (ref 74–97)
PHOSPHATE SERPL-MCNC: 3.4 MG/DL (ref 2.5–4.9)
PLATELET # BLD AUTO: 305 K/UL (ref 135–420)
PMV BLD AUTO: 10.1 FL (ref 9.2–11.8)
POTASSIUM SERPL-SCNC: 4.1 MMOL/L (ref 3.5–5.5)
PROT UR-MCNC: 18 MG/DL
PROT/CREAT UR-RTO: 0.1
PTH-INTACT SERPL-MCNC: 41.1 PG/ML (ref 18.4–88)
RBC # BLD AUTO: 4.42 M/UL (ref 4.7–5.5)
SODIUM SERPL-SCNC: 143 MMOL/L (ref 136–145)
WBC # BLD AUTO: 5.1 K/UL (ref 4.6–13.2)

## 2019-09-27 PROCEDURE — 85027 COMPLETE CBC AUTOMATED: CPT

## 2019-09-27 PROCEDURE — 80069 RENAL FUNCTION PANEL: CPT

## 2019-09-27 PROCEDURE — 83970 ASSAY OF PARATHORMONE: CPT

## 2019-09-27 PROCEDURE — 84156 ASSAY OF PROTEIN URINE: CPT

## 2019-09-27 PROCEDURE — 82306 VITAMIN D 25 HYDROXY: CPT

## 2019-09-27 PROCEDURE — 36415 COLL VENOUS BLD VENIPUNCTURE: CPT

## 2020-05-06 ENCOUNTER — HOSPITAL ENCOUNTER (OUTPATIENT)
Dept: GENERAL RADIOLOGY | Age: 80
Discharge: HOME | End: 2020-05-06
Payer: MEDICARE

## 2020-05-06 DIAGNOSIS — R05.9 COUGH IN ADULT: ICD-10-CM

## 2020-05-06 PROCEDURE — 71046 X-RAY EXAM CHEST 2 VIEWS: CPT

## 2020-07-07 ENCOUNTER — HOSPITAL ENCOUNTER (OUTPATIENT)
Dept: LAB | Age: 80
Discharge: HOME | End: 2020-07-07
Payer: MEDICARE

## 2020-07-07 LAB
25(OH)D3 SERPL-MCNC: 14.3 NG/ML (ref 30–100)
ALBUMIN SERPL-MCNC: 3.2 G/DL (ref 3.4–5)
ANION GAP SERPL CALC-SCNC: 5 MMOL/L (ref 3–18)
BUN SERPL-MCNC: 17 MG/DL (ref 7–18)
BUN/CREAT SERPL: 12 (ref 12–20)
CALCIUM SERPL-MCNC: 8.5 MG/DL (ref 8.5–10.1)
CALCIUM SERPL-MCNC: 8.6 MG/DL (ref 8.5–10.1)
CHLORIDE SERPL-SCNC: 111 MMOL/L (ref 100–111)
CO2 SERPL-SCNC: 28 MMOL/L (ref 21–32)
CREAT SERPL-MCNC: 1.45 MG/DL (ref 0.6–1.3)
CREAT UR-MCNC: 225 MG/DL (ref 30–125)
ERYTHROCYTE [DISTWIDTH] IN BLOOD BY AUTOMATED COUNT: 13.6 % (ref 11.6–14.5)
GLUCOSE SERPL-MCNC: 85 MG/DL (ref 74–99)
HCT VFR BLD AUTO: 38.9 % (ref 36–48)
HGB BLD-MCNC: 13.5 G/DL (ref 13–16)
MCH RBC QN AUTO: 27.1 PG (ref 24–34)
MCHC RBC AUTO-ENTMCNC: 34.7 G/DL (ref 31–37)
MCV RBC AUTO: 78.1 FL (ref 74–97)
PHOSPHATE SERPL-MCNC: 3.5 MG/DL (ref 2.5–4.9)
PLATELET # BLD AUTO: 321 K/UL (ref 135–420)
PMV BLD AUTO: 10.2 FL (ref 9.2–11.8)
POTASSIUM SERPL-SCNC: 4.5 MMOL/L (ref 3.5–5.5)
PROT UR-MCNC: 29 MG/DL
PROT/CREAT UR-RTO: 0.1
PTH-INTACT SERPL-MCNC: 81.5 PG/ML (ref 18.4–88)
RBC # BLD AUTO: 4.98 M/UL (ref 4.7–5.5)
SODIUM SERPL-SCNC: 144 MMOL/L (ref 136–145)
WBC # BLD AUTO: 4.5 K/UL (ref 4.6–13.2)

## 2020-07-07 PROCEDURE — 82306 VITAMIN D 25 HYDROXY: CPT

## 2020-07-07 PROCEDURE — 85027 COMPLETE CBC AUTOMATED: CPT

## 2020-07-07 PROCEDURE — 83970 ASSAY OF PARATHORMONE: CPT

## 2020-07-07 PROCEDURE — 36415 COLL VENOUS BLD VENIPUNCTURE: CPT

## 2020-07-07 PROCEDURE — 84156 ASSAY OF PROTEIN URINE: CPT

## 2020-07-07 PROCEDURE — 80069 RENAL FUNCTION PANEL: CPT

## 2021-02-19 ENCOUNTER — HOSPITAL ENCOUNTER (OUTPATIENT)
Dept: LAB | Age: 81
Discharge: HOME | End: 2021-02-19
Payer: MEDICARE

## 2021-02-19 LAB
ALBUMIN SERPL-MCNC: 3.4 G/DL (ref 3.4–5)
ANION GAP SERPL CALC-SCNC: 3 MMOL/L (ref 3–18)
BUN SERPL-MCNC: 23 MG/DL (ref 7–18)
BUN/CREAT SERPL: 17 (ref 12–20)
CALCIUM SERPL-MCNC: 8.8 MG/DL (ref 8.5–10.1)
CHLORIDE SERPL-SCNC: 106 MMOL/L (ref 100–111)
CO2 SERPL-SCNC: 31 MMOL/L (ref 21–32)
CREAT SERPL-MCNC: 1.39 MG/DL (ref 0.6–1.3)
ERYTHROCYTE [DISTWIDTH] IN BLOOD BY AUTOMATED COUNT: 13.5 % (ref 11.6–14.5)
GLUCOSE SERPL-MCNC: 95 MG/DL (ref 74–99)
HCT VFR BLD AUTO: 42.6 % (ref 36–48)
HGB BLD-MCNC: 15.4 G/DL (ref 13–16)
MCH RBC QN AUTO: 27.8 PG (ref 24–34)
MCHC RBC AUTO-ENTMCNC: 36.2 G/DL (ref 31–37)
MCV RBC AUTO: 77 FL (ref 74–97)
PHOSPHATE SERPL-MCNC: 3.3 MG/DL (ref 2.5–4.9)
PLATELET # BLD AUTO: 278 K/UL (ref 135–420)
PMV BLD AUTO: 10.2 FL (ref 9.2–11.8)
POTASSIUM SERPL-SCNC: 4.2 MMOL/L (ref 3.5–5.5)
RBC # BLD AUTO: 5.53 M/UL (ref 4.7–5.5)
SODIUM SERPL-SCNC: 140 MMOL/L (ref 136–145)
WBC # BLD AUTO: 5.7 K/UL (ref 4.6–13.2)

## 2021-02-19 PROCEDURE — 85027 COMPLETE CBC AUTOMATED: CPT

## 2021-02-19 PROCEDURE — 36415 COLL VENOUS BLD VENIPUNCTURE: CPT

## 2021-02-19 PROCEDURE — 80069 RENAL FUNCTION PANEL: CPT

## 2021-06-03 ENCOUNTER — DOCUMENTATION ONLY (OUTPATIENT)
Dept: PULMONOLOGY | Age: 81
End: 2021-06-03

## 2021-06-03 NOTE — PROGRESS NOTES
Lm w/pts daughter for pt to call and schd np appt - Dr Ana Hernandez - Kayley Malloy - cxr 5/6/20 hbv - prwrk in pending folder vannesa

## 2021-06-08 PROBLEM — R10.0 ACUTE ABDOMINAL PAIN SYNDROME: Status: ACTIVE | Noted: 2021-06-08

## 2021-06-08 PROBLEM — E55.9 VITAMIN D DEFICIENCY: Status: ACTIVE | Noted: 2020-07-29

## 2021-06-08 PROBLEM — M54.9 BACKACHE: Status: ACTIVE | Noted: 2021-06-08

## 2021-06-08 PROBLEM — C76.0 HEAD AND NECK CANCER (HCC): Status: ACTIVE | Noted: 2019-08-11

## 2021-06-08 PROBLEM — N20.0 BILATERAL KIDNEY STONES: Status: ACTIVE | Noted: 2021-06-08

## 2021-06-08 PROBLEM — N40.0 ENLARGED PROSTATE: Status: ACTIVE | Noted: 2021-06-08

## 2021-06-08 PROBLEM — R73.01 IMPAIRED FASTING GLUCOSE: Status: ACTIVE | Noted: 2021-06-08

## 2021-06-08 PROBLEM — I12.9 MALIGNANT HYPERTENSIVE KIDNEY DISEASE WITH CHRONIC KIDNEY DISEASE STAGE I THROUGH STAGE IV, OR UNSPECIFIED: Status: ACTIVE | Noted: 2020-07-29

## 2021-06-08 PROBLEM — J30.9 ALLERGIC RHINITIS: Status: ACTIVE | Noted: 2021-06-08

## 2021-06-08 PROBLEM — E78.5 HYPERLIPIDEMIA: Status: ACTIVE | Noted: 2021-06-08

## 2021-06-08 PROBLEM — I10 ESSENTIAL HYPERTENSION: Status: ACTIVE | Noted: 2021-06-08

## 2021-06-08 PROBLEM — L30.9 ACUTE DERMATITIS: Status: ACTIVE | Noted: 2021-06-08

## 2021-06-08 PROBLEM — Z23 NEED FOR IMMUNIZATION AGAINST INFLUENZA: Status: ACTIVE | Noted: 2021-06-08

## 2021-06-08 PROBLEM — R16.0 HEPATOMEGALIA: Status: ACTIVE | Noted: 2021-06-08

## 2021-06-08 PROBLEM — N18.31 STAGE 3A CHRONIC KIDNEY DISEASE (HCC): Status: ACTIVE | Noted: 2020-07-29

## 2021-06-08 PROBLEM — R97.20 ELEVATED PSA: Status: ACTIVE | Noted: 2021-06-08

## 2021-06-08 PROBLEM — R19.00 ABDOMINAL MASS: Status: ACTIVE | Noted: 2021-06-08

## 2021-06-08 PROBLEM — N52.9 ERECTILE DYSFUNCTION: Status: ACTIVE | Noted: 2021-06-08

## 2021-06-08 PROBLEM — R06.02 BREATH SHORTNESS: Status: ACTIVE | Noted: 2021-06-08

## 2021-06-08 PROBLEM — Z01.818 PRE-OPERATIVE EXAMINATION: Status: ACTIVE | Noted: 2021-06-08

## 2021-06-08 PROBLEM — M79.602 ARM PAIN, DIFFUSE, LEFT: Status: ACTIVE | Noted: 2021-06-08

## 2021-06-08 PROBLEM — Z20.2 EXPOSURE TO SEXUALLY TRANSMITTED DISEASE (STD): Status: ACTIVE | Noted: 2021-06-08

## 2021-06-08 PROBLEM — Q61.2 POLYCYSTIC KIDNEY, ADULT TYPE: Status: ACTIVE | Noted: 2020-07-29

## 2021-06-08 PROBLEM — I12.9: Status: ACTIVE | Noted: 2021-06-08

## 2021-06-08 PROBLEM — J04.0 LARYNGITIS: Status: ACTIVE | Noted: 2021-06-08

## 2021-06-08 PROBLEM — K21.9 GERD WITHOUT ESOPHAGITIS: Status: ACTIVE | Noted: 2021-06-08

## 2021-06-08 PROBLEM — I99.8 ISCHEMIC VASCULAR DISEASE: Status: ACTIVE | Noted: 2021-06-08

## 2021-06-08 PROBLEM — R49.0 HOARSENESS: Status: ACTIVE | Noted: 2021-06-08

## 2021-06-08 PROBLEM — H11.449 CONJUNCTIVAL CYST: Status: ACTIVE | Noted: 2021-06-08

## 2021-06-08 PROBLEM — R14.0 ABDOMINAL DISTENSION: Status: ACTIVE | Noted: 2021-06-08

## 2021-06-08 PROBLEM — R00.1 BRADYCARDIA: Status: ACTIVE | Noted: 2021-06-08

## 2021-06-08 PROBLEM — K92.2 ACUTE GI BLEEDING: Status: ACTIVE | Noted: 2021-03-30

## 2021-06-08 PROBLEM — H02.109 ECTROPION: Status: ACTIVE | Noted: 2021-06-08

## 2021-06-08 PROBLEM — Z90.02: Status: ACTIVE | Noted: 2019-07-22

## 2021-06-08 PROBLEM — Z87.442 HISTORY OF KIDNEY STONES: Status: ACTIVE | Noted: 2021-06-08

## 2021-06-08 PROBLEM — F17.200 TOBACCO USE DISORDER: Status: ACTIVE | Noted: 2021-06-08

## 2021-06-08 PROBLEM — R35.0 URINARY FREQUENCY: Status: ACTIVE | Noted: 2021-06-08

## 2021-06-08 PROBLEM — G45.8 ACUTE ANTERIOR CIRCULATION TIA: Status: ACTIVE | Noted: 2021-06-08

## 2021-06-08 PROBLEM — I65.23 ARTERIOSCLEROSIS OF BOTH CAROTID ARTERIES: Status: ACTIVE | Noted: 2021-06-08

## 2021-06-08 PROBLEM — J06.9 UPPER RESPIRATORY INFECTION: Status: ACTIVE | Noted: 2021-06-08

## 2021-06-08 PROBLEM — C16.9 GASTRIC ADENOCARCINOMA (HCC): Status: ACTIVE | Noted: 2021-06-08

## 2021-06-08 PROBLEM — N28.81 ENLARGED KIDNEY: Status: ACTIVE | Noted: 2021-06-08

## 2021-06-08 RX ORDER — OMEPRAZOLE 40 MG/1
40 CAPSULE, DELAYED RELEASE ORAL DAILY
COMMUNITY
Start: 2021-04-03 | End: 2021-06-10

## 2021-06-08 RX ORDER — FERROUS SULFATE 300 MG/5ML
300 LIQUID (ML) ORAL DAILY
COMMUNITY
Start: 2021-04-03 | End: 2021-06-10

## 2021-06-08 RX ORDER — BISACODYL 5 MG
TABLET, DELAYED RELEASE (ENTERIC COATED) ORAL
COMMUNITY

## 2021-06-08 RX ORDER — HYDRALAZINE HYDROCHLORIDE 50 MG/1
50 TABLET, FILM COATED ORAL 2 TIMES DAILY
COMMUNITY
Start: 2020-07-29

## 2021-06-08 RX ORDER — POLYETHYLENE GLYCOL 3350 17 G/17G
17 POWDER, FOR SOLUTION ORAL DAILY
COMMUNITY

## 2021-06-08 RX ORDER — TAMSULOSIN HYDROCHLORIDE 0.4 MG/1
0.4 CAPSULE ORAL
COMMUNITY
Start: 2021-04-03 | End: 2021-06-10

## 2021-06-10 ENCOUNTER — OFFICE VISIT (OUTPATIENT)
Dept: PULMONOLOGY | Age: 81
End: 2021-06-10
Payer: MEDICARE

## 2021-06-10 VITALS
OXYGEN SATURATION: 97 % | BODY MASS INDEX: 23.4 KG/M2 | WEIGHT: 145.6 LBS | SYSTOLIC BLOOD PRESSURE: 152 MMHG | HEART RATE: 59 BPM | HEIGHT: 66 IN | RESPIRATION RATE: 16 BRPM | DIASTOLIC BLOOD PRESSURE: 81 MMHG | TEMPERATURE: 97.9 F

## 2021-06-10 DIAGNOSIS — R06.02 SHORTNESS OF BREATH: ICD-10-CM

## 2021-06-10 DIAGNOSIS — Z87.891 PERSONAL HISTORY OF TOBACCO USE, PRESENTING HAZARDS TO HEALTH: ICD-10-CM

## 2021-06-10 DIAGNOSIS — J44.9 COPD, GROUP A, BY GOLD 2017 CLASSIFICATION (HCC): Primary | ICD-10-CM

## 2021-06-10 PROCEDURE — 99204 OFFICE O/P NEW MOD 45 MIN: CPT | Performed by: INTERNAL MEDICINE

## 2021-06-10 PROCEDURE — G8420 CALC BMI NORM PARAMETERS: HCPCS | Performed by: INTERNAL MEDICINE

## 2021-06-10 PROCEDURE — G8754 DIAS BP LESS 90: HCPCS | Performed by: INTERNAL MEDICINE

## 2021-06-10 PROCEDURE — G8510 SCR DEP NEG, NO PLAN REQD: HCPCS | Performed by: INTERNAL MEDICINE

## 2021-06-10 PROCEDURE — G8753 SYS BP > OR = 140: HCPCS | Performed by: INTERNAL MEDICINE

## 2021-06-10 PROCEDURE — G8536 NO DOC ELDER MAL SCRN: HCPCS | Performed by: INTERNAL MEDICINE

## 2021-06-10 PROCEDURE — 1101F PT FALLS ASSESS-DOCD LE1/YR: CPT | Performed by: INTERNAL MEDICINE

## 2021-06-10 PROCEDURE — G8427 DOCREV CUR MEDS BY ELIG CLIN: HCPCS | Performed by: INTERNAL MEDICINE

## 2021-06-10 RX ORDER — TIOTROPIUM BROMIDE AND OLODATEROL 3.124; 2.736 UG/1; UG/1
2 SPRAY, METERED RESPIRATORY (INHALATION) DAILY
Qty: 3 INHALER | Refills: 3 | Status: SHIPPED | OUTPATIENT
Start: 2021-06-10

## 2021-06-10 RX ORDER — LANSOPRAZOLE 30 MG/1
30 TABLET, ORALLY DISINTEGRATING, DELAYED RELEASE ORAL
COMMUNITY
Start: 2021-04-09

## 2021-06-10 RX ORDER — ALBUTEROL SULFATE 90 UG/1
2 AEROSOL, METERED RESPIRATORY (INHALATION)
COMMUNITY
Start: 2021-04-23

## 2021-06-10 RX ORDER — FLUTICASONE FUROATE, UMECLIDINIUM BROMIDE AND VILANTEROL TRIFENATATE 100; 62.5; 25 UG/1; UG/1; UG/1
1 POWDER RESPIRATORY (INHALATION) DAILY
COMMUNITY
Start: 2021-03-22 | End: 2021-06-10

## 2021-06-10 RX ORDER — MULTIVITAMIN 9 MG/15 ML
15 LIQUID ORAL DAILY
COMMUNITY
Start: 2021-04-03

## 2021-06-10 RX ORDER — ALBUTEROL SULFATE 90 UG/1
1-2 AEROSOL, METERED RESPIRATORY (INHALATION)
Qty: 1 INHALER | Refills: 5 | Status: SHIPPED | OUTPATIENT
Start: 2021-06-10

## 2021-06-10 RX ORDER — CHLORPHENIRAMINE/PSEUDOEPHED 1-15MG/5ML
SYRUP ORAL
COMMUNITY
Start: 2021-03-22

## 2021-06-10 NOTE — PROGRESS NOTES
[address] [place] [address] [facility]  Pulmonary, Critical Care, and Sleep Medicine    Pulmonary Office Initial Consultation  Name: Marsha Urban [de-identified] y.o. male  MRN: [id number]  : 1940  Service Date: 06/10/21    Referring Provider: Link Valera, [address],  [facility] [address]  Chief Complaint:   Chief Complaint   Patient presents with   Marlee Shepard Referral / Consult     referred by Dr. Sachin Saab for COPD    Results     CXR 2020, PET/CT 2021 DR ORALIA HIN[facility]) COVID (-) 3/30/2021 DR ORALIA HINES [facility]), CXR & CT 3/30/2021 DR ORALIA HINES [facility])       History of Present Illness: (pt accompanied by his daughter who provides some of history)  Marsha Urban is a [de-identified] y.o. male, who presents to Pulmonary clinic referred for management of COPD by his PCP. Per the daughter, pt was admitted to [facility] in March and was found to have a gastric cancer. They reported that he had a PET/CT and a liver MRI. Patient has an upcoming EGD. Pt follows with Oncology - Dr. Sneha Reyes. Quit smoking about 3 years ago, at max was smoking 1PPD  Occ Hx:  Current , former    Hx of laryngeal cancer in 2019, followed with Dr. Sneha Reyes, s/p radiation. Patient reports that with regards to dyspnea, he has  dyspnea with strenouus exertion -- reports dyspnea with heat, bending over, heavy exertion. Otherwise, no issues walking on level ground, able to perform ADLs, mMRC of 1-2. Denies any dyspnea triggers such as pollen, perfumes, fragrances, etc.  Patient was recently started on inhalers. Patient was prescribed Trelegy Ellipta and given Symbicort 80/4.5 MCG to be used as needed. Patient reports he does not use Trelegy, only had one filled due to high co-pay of $30.  Patient reports infrequent use of Symbicort.   Reports that he does not rinse his mouth out  Denies any issues with chronic cough, angina, chest pain, night sweats    Past Medical History:   Diagnosis Date    Abdominal cramps 2013    Chronic kidney disease with malignant hypertension 2021    Chronic obstructive pulmonary disease (HCC)     GERD (gastroesophageal reflux disease)     Hyperlipidemia 2021    Laryngeal cancer (HCC)     as per MD Pre-op diagnosis     Past Surgical History:   Procedure Laterality Date    COLONOSCOPY N/A 2018    COLONOSCOPY, SCREENING /c Hot Snared Polypectomy performed by Jarod Arias MD at [facility] HX CHOLECYSTECTOMY      HX HEENT  2016    throat ca     Family History   Problem Relation Age of Onset    Heart Disease Brother         murmur     Social History     Socioeconomic History    Marital status: UNKNOWN     Spouse name: Not on file    Number of children: Not on file    Years of education: Not on file    Highest education level: Not on file   Occupational History    Not on file   Tobacco Use    Smoking status: Former Smoker     Packs/day: 0.50     Years: 60.00     Pack years: 30.00     Quit date: 2016     Years since quittin.8    Smokeless tobacco: Never Used   Vaping Use    Vaping Use: Never used   Substance and Sexual Activity    Alcohol use: Yes     Comment: occasional    Drug use: No    Sexual activity: Not on file   Other Topics Concern    Not on file   Social History Narrative    Not on file     Social Determinants of Health     Financial Resource Strain:     Difficulty of Paying Living Expenses:    Food Insecurity:     Worried About Running Out of Food in the Last Year:     Ran Out of Food in the Last Year:    Transportation Needs:     Lack of Transportation (Medical):      Lack of Transportation (Non-Medical):    Physical Activity:     Days of Exercise per Week:     Minutes of Exercise per Session:    Stress:     Feeling of Stress :    Social Connections:     Frequency of Communication with Friends and Family:     Frequency of Social Gatherings with Friends and Family:     Attends Confucianism Services:  Active Member of Clubs or Organizations:     Attends Club or Organization Meetings:     Marital Status:    Intimate Partner Violence:     Fear of Current or Ex-Partner:     Emotionally Abused:     Physically Abused:     Sexually Abused: Allergies   Allergen Reactions    Pcn [Penicillins] Rash     Prior to Admission medications    Medication Sig Start Date End Date Taking? Authorizing Provider   hydrALAZINE (APRESOLINE) 50 mg tablet Take 50 mg by mouth two (2) times a day. 7/29/20  Yes Provider, Historical   polyethylene glycol (MIRALAX) 17 gram/dose powder Take 17 g by mouth daily. Yes Provider, Historical   ferrous sulfate 300 mg (60 mg iron)/5 mL syrup Take 300 mg by mouth daily. 4/3/21  Yes Provider, Historical   multivitamins with minerals liqd Take 15 mL by mouth daily. 4/3/21  Yes Provider, Historical   omeprazole (PRILOSEC) 40 mg capsule Take 40 mg by mouth daily. 4/3/21  Yes Provider, Historical   tamsulosin (FLOMAX) 0.4 mg capsule Take 0.4 mg by mouth. 4/3/21  Yes Provider, Historical   simvastatin (ZOCOR) 20 mg tablet Take 20 mg by mouth nightly. Yes Provider, Historical   bisacodyL (DULCOLAX) 5 mg EC tablet Gentle Laxative (bisacodyl) 5 mg tablet,delayed release    Provider, Historical   budesonide-formoterol (SYMBICORT) 80-4.5 mcg/actuation HFAA Symbicort 80 mcg-4.5 mcg/actuation HFA aerosol inhaler    Provider, Historical   sildenafil citrate (VIAGRA) 100 mg tablet TAKE 1 (ONE) TABLET DAILY, AS NEEDED TAKE 30-60 MINUTES PRIOR TO SEXUAL ENCOUNTER 6/9/19   Provider, Historical   cholecalciferol (VITAMIN D3) 1,000 unit cap Take  by mouth daily. Provider, Historical   amLODIPine (NORVASC) 10 mg tablet Take  by mouth daily. Provider, Historical   atenolol (TENORMIN) 50 mg tablet Take 50 mg by mouth daily. Provider, Historical   aspirin delayed-release 81 mg tablet Take  by mouth daily. Provider, Historical   Levocetirizine (XYZAL) 5 mg tablet Take 5 mg by mouth daily. Provider, Historical     Immunization History   Administered Date(s) Administered    Hep A and Hep B Vaccine 11/15/2012, 01/04/2013    Influenza High Dose Vaccine PF 09/24/2015, 10/04/2016, 08/29/2017    Influenza Vaccine 02/24/2021    Influenza Vaccine PF 01/27/2015    Pneumococcal Conjugate (PCV-13) 01/05/2016    Pneumococcal Polysaccharide (PPSV-23) 07/10/2014, 06/29/2015       Review of Systems:  A complete review of systems was performed as stated in the HPI, all others are negative. Objective:    Physical Exam:  BP (!) 152/81 (BP 1 Location: Right arm, BP Patient Position: Sitting, BP Cuff Size: Adult)   Pulse (!) 59   Temp 97.9 °F (36.6 °C) (Temporal)   Resp 16   Ht 5' 6\" (1.676 m)   Wt 66 kg (145 lb 9.6 oz)   SpO2 97%   BMI 23.50 kg/m²   Vitals were personally reviewed  Gen: no acute distress, pleasant and cooperative, sitting up in chair, ambulates without difficulty  HEENT: normocephalic/atraumatic, no ocular drainage, EOMI, no scleral icterus, nasal bridge midline, unable to assess nasal and oral cavities due to patient wearing mask in the setting of COVID-19 pandemic  Neck: supple, trachea midline, no JVD, dressing over wound over center of trachea where patient likely had tracheostomy  CVS: regular rate rhythm, S1/S2, no murmurs/rubs/gallops  Lungs: Distant breath sounds bilaterally, some scattered rhonchi in bilateral bases, no wheezes  Ext: no pitting edema B/L, no peripheral cyanosis or clubbing    Labs:   I have reviewed the patient's available labs  Lab Results   Component Value Date/Time    WBC 5.7 02/19/2021 12:49 PM    HGB 15.4 02/19/2021 12:49 PM    HCT 42.6 02/19/2021 12:49 PM    PLATELET 587 70/18/1590 12:49 PM    MCV 77.0 02/19/2021 12:49 PM     Lab Results   Component Value Date/Time    Sodium 140 02/19/2021 12:49 PM    Potassium 4.2 02/19/2021 12:49 PM    Chloride 106 02/19/2021 12:49 PM    CO2 31 02/19/2021 12:49 PM    Anion gap 3 02/19/2021 12:49 PM    Glucose 95 02/19/2021 12:49 PM    BUN 23 (H) 02/19/2021 12:49 PM    Creatinine 1.39 (H) 02/19/2021 12:49 PM    BUN/Creatinine ratio 17 02/19/2021 12:49 PM    GFR est AA 60 (L) 02/19/2021 12:49 PM    GFR est non-AA 49 (L) 02/19/2021 12:49 PM    Calcium 8.8 02/19/2021 12:49 PM    Bilirubin, total 0.4 04/14/2020 09:47 AM    Alk. phosphatase 75 04/14/2020 09:47 AM    Protein, total 7.2 04/14/2020 09:47 AM    Albumin 3.4 02/19/2021 12:49 PM    ALT (SGPT) 10 (L) 04/14/2020 09:47 AM    AST (SGOT) 8 (L) 04/14/2020 09:47 AM       Outside records reviewed in clinic as follows:  -Last progress note by PCP, JASVIR Hannah from 3/15/2021, reports that the patient has COPD, no issues with dyspnea or dyspnea at rest.  Patient did have a chest x-ray. Symptoms not associated with fever or weakness. Current treatment includes smoking cessation program, patient was referred to pulmonary medicine for further management. Imaging:  I have personally reviewed patient's imaging as follows--chest x-ray PA and lateral from 5/6/2020 shows mainly clear lung fields bilaterally without infiltrate, mass, effusion, infiltrate. Patient does have a tracheostomy present. Official report per radiology:  XR Results (most recent):  Results from Hospital Encounter encounter on 05/06/20    XR CHEST PA LAT    Narrative  CHEST PA AND LATERAL      HISTORY: Throat cancer, increased congestion, cough and adults (R05), laryngeal  cancer, COPD. COMPARISONS: 6/10/2019, 6/12/2017 and prior studies. FINDINGS:    Two views of the chest were obtained on 5/6/2020. Postsurgical clips now  project over the neck and upper chest. A rounded density projecting over the  trachea potentially could be part of a tracheostomy tube. No lobar  consolidation. There is no evidence of pleural effusions, pneumothorax, or  pulmonary vascular congestion. Cardiac silhouette size is normal.    Mediastinal contours are unremarkable.     Impression  IMPRESSION:    New postsurgical changes in the lower neck/upper chest region. No acute infiltrate. Lian Corral PFTs: None on file    TTE:  I have reviewed the patient's TTE results  No results found for this or any previous visit. Assessment and Plan:  [de-identified] y.o. male with:    Impression:  1. COPD, gold risk category A  2. Dyspnea on exertion/shortness of breath: Relatively mild  3. History of laryngeal cancer status post radiation and tracheostomy  4. History of gastric cancer: Follows with Dr. Silvestre Fuller  5. History of tobacco use: Patient quit smoking 2018, prior 1 pack/day smoker    Plan:  -Full PFTs at next visit  -We will defer chest imaging to patient's oncologist given that he has been worked up for gastric cancer and needs surveillance for laryngeal cancer. At age [de-identified], patient only needs 1 year of annual cancer surveillance, which would likely be covered by patient's surveillance for other cancers, will defer to oncology  -Had a long discussion with the patient and his daughter regarding inhaler regimen, they would prefer inhalers for which patient would not be at risk for thrush. Thus, discontinue Trelegy and Symbicort  -Start Stiolto Respimat 2 puffs once daily. Sample given in clinic  -Start Albuterol HFA 1-2puffs q4-6h PRN. Counseled patient that this is their rescue inhaler. Also counseled patient regarding premedication 15-30m prior to exercise or exposure to very cold air  -Counseled patient on proper inhaler technique  -Immunizations reviewed, influenza pneumococcal vaccinations up-to-date  -Advised patient to remain active  -Counseled patient regarding lifestyle precautions in COVID-19 pandemic including wearing mask in public and confined spaces, social/physical distancing, frequent hand hygiene, etc.  Jane Velazquez pt to receive COVID-19 vaccination when possible    Follow-up and Dispositions    · Return in about 1 year (around 6/10/2022).        Orders Placed This Encounter    AMB POC PFT COMPLETE W/BRONCHODILATOR    AMB POC PFT COMPLETE W/O BRONCHODILATOR    GAS DILUT/WASHOUT LUNG VOL W/WO DISTRIB VENT&VOL    DIFFUSING CAPACITY    albuterol (PROVENTIL HFA, VENTOLIN HFA, PROAIR HFA) 90 mcg/actuation inhaler    tiotropium-olodateroL (STIOLTO RESPIMAT) 2.5-2.5 mcg/actuation inhaler    tiotropium-olodateroL (Stiolto Respimat) 2.5-2.5 mcg/actuation inhaler         Alma Andrea MD/MPH     Pulmonary, Critical Care Medicine  [facility]

## 2021-06-10 NOTE — LETTER
6/10/2021 Patient: Abraham Trejo YOB: 1940 Date of Visit: 6/10/2021 Josie Dykes MD 
[address] [address] [address] Fax: [phone] Miladys Kilgore MD 
[address] [phone] [address] Fax: [phone] Dear MD Miladys Fung MD, Thank you for referring Mr. Harlan Love to [address] [place] [address] for evaluation. My notes for this consultation are attached. If you have questions, please do not hesitate to call me. I look forward to following your patient along with you. Sincerely, Read Claudia ANDERS/MPH Pulmonary, Critical Care Medicine [facility]

## 2021-06-10 NOTE — PROGRESS NOTES
Craig Mendes presents today for   Chief Complaint   Patient presents with   Arlea.Asad Referral / Consult     referred by Dr. Rajinder Quezada for COPD    Results     CXR 5/6/2020, PET/CT 4/13/2021 ([facility]) COVID (-) 3/30/2021 Hettie Goodpasture), CXR & CT 3/30/2021 Hettie Goodpasture)       Is someone accompanying this pt? Yes. Family member    Is the patient using any DME equipment during [address]? No    -DME Company N/A    Depression Screening:  3 most recent PHQ Screens 6/10/2021   Little interest or pleasure in doing things Not at all   Feeling down, depressed, irritable, or hopeless Not at all   Total Score PHQ 2 0       Learning Assessment:  Learning Assessment 6/10/2021   PRIMARY LEARNER Patient   BARRIERS PRIMARY LEARNER -   PRIMARY LANGUAGE ENGLISH   LEARNER PREFERENCE PRIMARY DEMONSTRATION   ANSWERED BY Patient   RELATIONSHIP SELF       Abuse Screening:  Abuse Screening Questionnaire 6/10/2021   Do you ever feel afraid of your partner? N   Are you in a relationship with someone who physically or mentally threatens you? N   Is it safe for you to go home? Y       Fall Risk  Fall Risk Assessment, last 12 mths 7/17/2019   Able to walk? Yes   Fall in past 12 months? No         Coordination of Care:  1. Have you been to the ER, urgent care clinic since your last visit? Hospitalized since your last visit? Yes; Where: [facility], When: 2/23-2/25/2021-chest pain & 3/30-4/13/2021-upper GI bleed    2. Have you seen or consulted any other health care providers outside of the [address] [place] [address] since your last visit? Include any pap smears or colon screening. Yes.  Dr. Yeimy Tolbert, PCP

## 2021-07-02 ENCOUNTER — HOSPITAL ENCOUNTER (OUTPATIENT)
Dept: LAB | Age: 81
Discharge: HOME | End: 2021-07-02
Payer: MEDICARE

## 2021-07-02 LAB
25(OH)D3 SERPL-MCNC: 15 NG/ML (ref 30–100)
ALBUMIN SERPL-MCNC: 3.5 G/DL (ref 3.4–5)
ANION GAP SERPL CALC-SCNC: 5 MMOL/L (ref 3–18)
BUN SERPL-MCNC: 26 MG/DL (ref 7–18)
BUN/CREAT SERPL: 18 (ref 12–20)
CALCIUM SERPL-MCNC: 9.1 MG/DL (ref 8.5–10.1)
CALCIUM SERPL-MCNC: 9.2 MG/DL (ref 8.5–10.1)
CHLORIDE SERPL-SCNC: 110 MMOL/L (ref 100–111)
CO2 SERPL-SCNC: 30 MMOL/L (ref 21–32)
CREAT SERPL-MCNC: 1.45 MG/DL (ref 0.6–1.3)
CREAT UR-MCNC: 93 MG/DL (ref 30–125)
ERYTHROCYTE [DISTWIDTH] IN BLOOD BY AUTOMATED COUNT: 13.3 % (ref 11.6–14.5)
GLUCOSE SERPL-MCNC: 92 MG/DL (ref 74–99)
HCT VFR BLD AUTO: 37.2 % (ref 36–48)
HGB BLD-MCNC: 12.7 G/DL (ref 13–16)
MCH RBC QN AUTO: 26.7 PG (ref 24–34)
MCHC RBC AUTO-ENTMCNC: 34.1 G/DL (ref 31–37)
MCV RBC AUTO: 78.3 FL (ref 74–97)
PHOSPHATE SERPL-MCNC: 3.6 MG/DL (ref 2.5–4.9)
PLATELET # BLD AUTO: 334 K/UL (ref 135–420)
PMV BLD AUTO: 10.1 FL (ref 9.2–11.8)
POTASSIUM SERPL-SCNC: 5.1 MMOL/L (ref 3.5–5.5)
PROT UR-MCNC: 12 MG/DL
PROT/CREAT UR-RTO: 0.1
PTH-INTACT SERPL-MCNC: 62.4 PG/ML (ref 18.4–88)
RBC # BLD AUTO: 4.75 M/UL (ref 4.35–5.65)
SODIUM SERPL-SCNC: 145 MMOL/L (ref 136–145)
WBC # BLD AUTO: 4.7 K/UL (ref 4.6–13.2)

## 2021-07-02 PROCEDURE — 82306 VITAMIN D 25 HYDROXY: CPT

## 2021-07-02 PROCEDURE — 80069 RENAL FUNCTION PANEL: CPT

## 2021-07-02 PROCEDURE — 84156 ASSAY OF PROTEIN URINE: CPT

## 2021-07-02 PROCEDURE — 83970 ASSAY OF PARATHORMONE: CPT

## 2021-07-02 PROCEDURE — 36415 COLL VENOUS BLD VENIPUNCTURE: CPT

## 2021-07-02 PROCEDURE — 85027 COMPLETE CBC AUTOMATED: CPT

## 2021-07-08 PROBLEM — Z01.818 PRE-OPERATIVE EXAMINATION: Status: RESOLVED | Noted: 2021-06-08 | Resolved: 2021-07-08

## 2021-07-08 PROBLEM — Z23 NEED FOR IMMUNIZATION AGAINST INFLUENZA: Status: RESOLVED | Noted: 2021-06-08 | Resolved: 2021-07-08

## 2021-10-13 ENCOUNTER — HOSPITAL ENCOUNTER (OUTPATIENT)
Dept: GENERAL RADIOLOGY | Age: 81
Discharge: HOME | End: 2021-10-13
Payer: MEDICARE

## 2021-10-13 DIAGNOSIS — M79.603 ARM PAIN: ICD-10-CM

## 2021-10-13 PROCEDURE — 73030 X-RAY EXAM OF SHOULDER: CPT

## 2021-11-04 ENCOUNTER — HOSPITAL ENCOUNTER (OUTPATIENT)
Dept: LAB | Age: 81
Discharge: HOME | End: 2021-11-04
Payer: MEDICARE

## 2021-11-04 LAB
25(OH)D3 SERPL-MCNC: 15.2 NG/ML (ref 30–100)
ALBUMIN SERPL-MCNC: 3.4 G/DL (ref 3.4–5)
ANION GAP SERPL CALC-SCNC: 3 MMOL/L (ref 3–18)
BUN SERPL-MCNC: 18 MG/DL (ref 7–18)
BUN/CREAT SERPL: 12 (ref 12–20)
CALCIUM SERPL-MCNC: 8.8 MG/DL (ref 8.5–10.1)
CALCIUM SERPL-MCNC: 9 MG/DL (ref 8.5–10.1)
CHLORIDE SERPL-SCNC: 108 MMOL/L (ref 100–111)
CO2 SERPL-SCNC: 29 MMOL/L (ref 21–32)
CREAT SERPL-MCNC: 1.55 MG/DL (ref 0.6–1.3)
CREAT UR-MCNC: 200 MG/DL (ref 30–125)
ERYTHROCYTE [DISTWIDTH] IN BLOOD BY AUTOMATED COUNT: 13.4 % (ref 11.6–14.5)
GLUCOSE SERPL-MCNC: 83 MG/DL (ref 74–99)
HCT VFR BLD AUTO: 40 % (ref 36–48)
HGB BLD-MCNC: 13.4 G/DL (ref 13–16)
MCH RBC QN AUTO: 26.1 PG (ref 24–34)
MCHC RBC AUTO-ENTMCNC: 33.5 G/DL (ref 31–37)
MCV RBC AUTO: 78 FL (ref 78–100)
PHOSPHATE SERPL-MCNC: 4.1 MG/DL (ref 2.5–4.9)
PLATELET # BLD AUTO: 304 K/UL (ref 135–420)
PMV BLD AUTO: 10.2 FL (ref 9.2–11.8)
POTASSIUM SERPL-SCNC: 4.6 MMOL/L (ref 3.5–5.5)
PROT UR-MCNC: 23 MG/DL
PROT/CREAT UR-RTO: 0.1
PTH-INTACT SERPL-MCNC: 47.9 PG/ML (ref 18.4–88)
RBC # BLD AUTO: 5.13 M/UL (ref 4.35–5.65)
SODIUM SERPL-SCNC: 140 MMOL/L (ref 136–145)
WBC # BLD AUTO: 6.2 K/UL (ref 4.6–13.2)

## 2021-11-04 PROCEDURE — 83970 ASSAY OF PARATHORMONE: CPT

## 2021-11-04 PROCEDURE — 85027 COMPLETE CBC AUTOMATED: CPT

## 2021-11-04 PROCEDURE — 82306 VITAMIN D 25 HYDROXY: CPT

## 2021-11-04 PROCEDURE — 80069 RENAL FUNCTION PANEL: CPT

## 2021-11-04 PROCEDURE — 36415 COLL VENOUS BLD VENIPUNCTURE: CPT

## 2021-11-04 PROCEDURE — 84156 ASSAY OF PROTEIN URINE: CPT

## 2022-05-19 ENCOUNTER — DOCUMENTATION ONLY (OUTPATIENT)
Dept: PULMONOLOGY | Age: 82
End: 2022-05-19